# Patient Record
Sex: FEMALE | Race: WHITE | URBAN - METROPOLITAN AREA
[De-identification: names, ages, dates, MRNs, and addresses within clinical notes are randomized per-mention and may not be internally consistent; named-entity substitution may affect disease eponyms.]

---
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
## 2023-12-14 ENCOUNTER — OFFICE VISIT (OUTPATIENT)
Age: 18
End: 2023-12-14
Payer: OTHER GOVERNMENT

## 2023-12-14 VITALS
HEART RATE: 94 BPM | DIASTOLIC BLOOD PRESSURE: 70 MMHG | OXYGEN SATURATION: 99 % | SYSTOLIC BLOOD PRESSURE: 110 MMHG | WEIGHT: 140 LBS

## 2023-12-14 DIAGNOSIS — R55 SYNCOPE, UNSPECIFIED SYNCOPE TYPE: Primary | ICD-10-CM

## 2023-12-14 DIAGNOSIS — I47.10 SVT (SUPRAVENTRICULAR TACHYCARDIA): ICD-10-CM

## 2023-12-14 PROCEDURE — 93000 ELECTROCARDIOGRAM COMPLETE: CPT | Performed by: INTERNAL MEDICINE

## 2023-12-14 PROCEDURE — 99204 OFFICE O/P NEW MOD 45 MIN: CPT | Performed by: INTERNAL MEDICINE

## 2023-12-14 RX ORDER — FLUOXETINE HYDROCHLORIDE 20 MG/1
20 CAPSULE ORAL DAILY
COMMUNITY

## 2023-12-14 RX ORDER — ROSUVASTATIN CALCIUM 20 MG/1
20 TABLET, COATED ORAL DAILY
COMMUNITY

## 2023-12-14 RX ORDER — CLONIDINE HYDROCHLORIDE 0.2 MG/1
0.2 TABLET ORAL 2 TIMES DAILY
COMMUNITY

## 2023-12-14 RX ORDER — LISDEXAMFETAMINE DIMESYLATE CAPSULES 40 MG/1
CAPSULE ORAL
COMMUNITY

## 2023-12-14 NOTE — PROGRESS NOTES
you kindly for allowing me to participate in this patient's care. Wt Readings from Last 3 Encounters:   11/15/18 59 kg (130 lb) (73 %, Z= 0.62)*   10/30/18 58.9 kg (129 lb 12.8 oz) (73 %, Z= 0.62)*   07/10/17 62.6 kg (138 lb) (87 %, Z= 1.15)*     * Growth percentiles are based on [facility] (Girls, 2-20 Years) data. No past medical history on file. No current outpatient medications on file. No current facility-administered medications for this visit. Social History   has no history on file for tobacco use.   has no history on file for alcohol use. Family History  family history is not on file. Review of Systems  Except as stated above include:  Constitutional: Negative for fever, chills and malaise/fatigue. HEENT: No congestion or recent URI. Gastrointestinal: No nausea, vomiting, abdominal pain, bloody stools. Pulmonary:  Negative except as stated above. Cardiac:  Negative except as stated above. Musculoskeletal: Negative except as stated above. Neurological:  No localized symptoms. Endocrine:  Negative except as stated above. PHYSICAL EXAM  BP Readings from Last 3 Encounters:   11/15/18 108/72 (55 %, Z = 0.13 /  79 %, Z = 0.81)*   10/30/18 105/73 (42 %, Z = -0.20 /  81 %, Z = 0.88)*   07/10/17 104/60 (40 %, Z = -0.25 /  38 %, Z = -0.31)*     *BP percentiles are based on the 2017 AAP Clinical Practice Guideline for girls     Pulse Readings from Last 3 Encounters:   11/15/18 70   10/30/18 100   07/10/17 68     General:   Well developed, well groomed. Head/Neck:   No obvious jugular venous distention     No obvious carotid pulsations. No evidence of xanthelasma. Lungs:   No respiratory distress. Clear bilaterally. Heart:  Regular rate and rhythm. Abdomen:   Soft and nontender  Extremities:   Intact peripheral pulses. No significant edema. Neurological:   Alert and oriented to person, place, time. No focal neurological deficit visually.   Skin:   No

## 2024-04-02 ENCOUNTER — TELEPHONE (OUTPATIENT)
Age: 19
End: 2024-04-02

## 2024-04-02 NOTE — TELEPHONE ENCOUNTER
Per request received from Dr Ortiz. I called and ask to speak with patient. Mother stated that she was not home and could take message for patient. She stated that patient was seen in December and she was not willing to schedule another in office or surgery procedure. She would like for Dr Cruz to look at medical records first. She did not want daughter to have unnecessary surgery. I will make Dr Cruz aware of this due to text that was sent on 3-27-24 from Dr Ortiz.  If Dr Cruz feels that she will need a procedure I will call patient to schedule ablation due to SVT.

## 2025-04-15 ENCOUNTER — TELEPHONE (OUTPATIENT)
Age: 20
End: 2025-04-15

## 2025-04-15 NOTE — TELEPHONE ENCOUNTER
Per voice mail received. Called and had to leave message to make patient aware to call to schedule appointment to be seen by Dr Cruz to discuss ablation post appointment in 2023.

## 2025-05-14 ENCOUNTER — OFFICE VISIT (OUTPATIENT)
Age: 20
End: 2025-05-14
Payer: COMMERCIAL

## 2025-05-14 VITALS
SYSTOLIC BLOOD PRESSURE: 104 MMHG | DIASTOLIC BLOOD PRESSURE: 70 MMHG | HEART RATE: 100 BPM | OXYGEN SATURATION: 98 % | BODY MASS INDEX: 24.48 KG/M2 | HEIGHT: 62 IN | WEIGHT: 133 LBS

## 2025-05-14 DIAGNOSIS — R00.2 PALPITATIONS: ICD-10-CM

## 2025-05-14 DIAGNOSIS — I47.10 SVT (SUPRAVENTRICULAR TACHYCARDIA): Primary | ICD-10-CM

## 2025-05-14 PROCEDURE — 99204 OFFICE O/P NEW MOD 45 MIN: CPT | Performed by: INTERNAL MEDICINE

## 2025-05-14 NOTE — PROGRESS NOTES
HPI:  19-year-old female here for followup. I last saw her in December 2023. She was referred by Dr. Ortiz for evaluation of SVT treatment options. She had remote syncope. I have reviewed tracings from 2023 with concern for mostly sinus tachycardia. She was doing well for most of 2024. In January, she was given Cymbalta and she was taking Vyvanse and she had a severe episode, was seen in the ER. Since then, she has changed her diet, avoiding [employer] Pepper, trying to eat less fatty foods but still has intermittent palpitations that have worsened from last year with dizziness but no loss of consciousness.     Impression:   1. History of remote syncope about 2 1/2 years ago, most concerning for orthostasis.   2. Recent increase in palpitations and tachycardia with a severe episode January 2025 in the setting of taking Cymbalta and Vyvanse which was a likely trigger as these are both potentially stimulants.   3. Hyperlipidemia, previously on Repatha  4. History of structurally normal heart by previous echo.   5. History of SVT up to 190 BPM by report, Dr. Ortiz.     Plan:  She was doing relatively well for the most of 2024 but in January, she had an exacerbation with Vyvanse and Cymbalta. I am going to have her wear an event monitor for a couple of weeks and see her back afterwards with recommendations. I encouraged her to continue to avoid sugary drinks as well as fatty foods and to stay hydrated. May benefit from low-dose Corlanor dependent upon findings.         Wt Readings from Last 3 Encounters:   05/14/25 60.3 kg (133 lb)   12/14/23 63.5 kg (140 lb)   11/15/18 59 kg (73%, Z= 0.62)*     * Growth percentiles are based on CDC (Girls, 2-20 Years) data.     No past medical history on file.    No current outpatient medications on file.     No current facility-administered medications for this visit.       Social History   reports that she has never smoked. She has never used smokeless tobacco.   has no history on file